# Patient Record
Sex: FEMALE
[De-identification: names, ages, dates, MRNs, and addresses within clinical notes are randomized per-mention and may not be internally consistent; named-entity substitution may affect disease eponyms.]

---

## 2022-10-28 ENCOUNTER — APPOINTMENT (OUTPATIENT)
Dept: OTOLARYNGOLOGY | Facility: CLINIC | Age: 87
End: 2022-10-28

## 2022-10-28 DIAGNOSIS — C44.90 UNSPECIFIED MALIGNANT NEOPLASM OF SKIN, UNSPECIFIED: ICD-10-CM

## 2022-10-28 PROBLEM — Z00.00 ENCOUNTER FOR PREVENTIVE HEALTH EXAMINATION: Status: ACTIVE | Noted: 2022-10-28

## 2022-10-28 PROCEDURE — 11603 EXC TR-EXT MAL+MARG 2.1-3 CM: CPT

## 2022-10-28 PROCEDURE — 99204 OFFICE O/P NEW MOD 45 MIN: CPT | Mod: 25

## 2022-10-28 RX ORDER — CEPHALEXIN 250 MG/1
250 CAPSULE ORAL 3 TIMES DAILY
Qty: 30 | Refills: 0 | Status: ACTIVE | COMMUNITY
Start: 2022-10-28 | End: 1900-01-01

## 2022-10-28 NOTE — HISTORY OF PRESENT ILLNESS
[de-identified] : 89F with reported bx+ left lower leg BCC. Painful. No bleeding. Hx multiple skin cancer resections in the past.

## 2022-10-28 NOTE — PHYSICAL EXAM
[Normal] : palatine tonsils are normal [de-identified] : Well healed previous Mohs reconstructions, no concerning lesions

## 2022-11-09 LAB — CORE LAB BIOPSY: NORMAL

## 2022-11-12 DIAGNOSIS — J02.9 ACUTE PHARYNGITIS, UNSPECIFIED: ICD-10-CM

## 2022-11-12 RX ORDER — AZITHROMYCIN 250 MG/1
250 TABLET, FILM COATED ORAL
Qty: 1 | Refills: 0 | Status: ACTIVE | COMMUNITY
Start: 2022-11-12 | End: 1900-01-01

## 2022-11-15 DIAGNOSIS — G50.0 TRIGEMINAL NEURALGIA: ICD-10-CM

## 2022-12-07 RX ORDER — OXCARBAZEPINE 150 MG/1
150 TABLET, FILM COATED ORAL TWICE DAILY
Qty: 360 | Refills: 2 | Status: ACTIVE | COMMUNITY
Start: 2022-12-07 | End: 1900-01-01

## 2023-01-18 ENCOUNTER — EMERGENCY (EMERGENCY)
Facility: HOSPITAL | Age: 88
LOS: 1 days | Discharge: HOME | End: 2023-01-18
Attending: EMERGENCY MEDICINE
Payer: MEDICARE

## 2023-01-18 VITALS
HEART RATE: 87 BPM | SYSTOLIC BLOOD PRESSURE: 121 MMHG | DIASTOLIC BLOOD PRESSURE: 64 MMHG | WEIGHT: 110.01 LBS | HEIGHT: 60 IN | OXYGEN SATURATION: 100 % | TEMPERATURE: 98 F | RESPIRATION RATE: 16 BRPM

## 2023-01-18 LAB
ALBUMIN SERPL ELPH-MCNC: 4.4 G/DL — SIGNIFICANT CHANGE UP (ref 3.3–5.2)
ALP SERPL-CCNC: 88 U/L — SIGNIFICANT CHANGE UP (ref 40–120)
ALT FLD-CCNC: 28 U/L — SIGNIFICANT CHANGE UP
ANION GAP SERPL CALC-SCNC: 12 MMOL/L — SIGNIFICANT CHANGE UP (ref 5–17)
AST SERPL-CCNC: 33 U/L — HIGH
BASOPHILS # BLD AUTO: 0.05 K/UL — SIGNIFICANT CHANGE UP (ref 0–0.2)
BASOPHILS NFR BLD AUTO: 0.7 % — SIGNIFICANT CHANGE UP (ref 0–2)
BILIRUB SERPL-MCNC: 0.4 MG/DL — SIGNIFICANT CHANGE UP (ref 0.4–2)
BUN SERPL-MCNC: 25.4 MG/DL — HIGH (ref 8–20)
CALCIUM SERPL-MCNC: 9.6 MG/DL — SIGNIFICANT CHANGE UP (ref 8.4–10.5)
CHLORIDE SERPL-SCNC: 102 MMOL/L — SIGNIFICANT CHANGE UP (ref 96–108)
CO2 SERPL-SCNC: 26 MMOL/L — SIGNIFICANT CHANGE UP (ref 22–29)
CREAT SERPL-MCNC: 0.75 MG/DL — SIGNIFICANT CHANGE UP (ref 0.5–1.3)
EGFR: 75 ML/MIN/1.73M2 — SIGNIFICANT CHANGE UP
EOSINOPHIL # BLD AUTO: 0.09 K/UL — SIGNIFICANT CHANGE UP (ref 0–0.5)
EOSINOPHIL NFR BLD AUTO: 1.2 % — SIGNIFICANT CHANGE UP (ref 0–6)
GLUCOSE SERPL-MCNC: 99 MG/DL — SIGNIFICANT CHANGE UP (ref 70–99)
HCT VFR BLD CALC: 38.2 % — SIGNIFICANT CHANGE UP (ref 34.5–45)
HGB BLD-MCNC: 12 G/DL — SIGNIFICANT CHANGE UP (ref 11.5–15.5)
IMM GRANULOCYTES NFR BLD AUTO: 0.3 % — SIGNIFICANT CHANGE UP (ref 0–0.9)
LYMPHOCYTES # BLD AUTO: 1.91 K/UL — SIGNIFICANT CHANGE UP (ref 1–3.3)
LYMPHOCYTES # BLD AUTO: 26.1 % — SIGNIFICANT CHANGE UP (ref 13–44)
MCHC RBC-ENTMCNC: 29.3 PG — SIGNIFICANT CHANGE UP (ref 27–34)
MCHC RBC-ENTMCNC: 31.4 GM/DL — LOW (ref 32–36)
MCV RBC AUTO: 93.4 FL — SIGNIFICANT CHANGE UP (ref 80–100)
MONOCYTES # BLD AUTO: 0.62 K/UL — SIGNIFICANT CHANGE UP (ref 0–0.9)
MONOCYTES NFR BLD AUTO: 8.5 % — SIGNIFICANT CHANGE UP (ref 2–14)
NEUTROPHILS # BLD AUTO: 4.62 K/UL — SIGNIFICANT CHANGE UP (ref 1.8–7.4)
NEUTROPHILS NFR BLD AUTO: 63.2 % — SIGNIFICANT CHANGE UP (ref 43–77)
PLATELET # BLD AUTO: 197 K/UL — SIGNIFICANT CHANGE UP (ref 150–400)
POTASSIUM SERPL-MCNC: 4.2 MMOL/L — SIGNIFICANT CHANGE UP (ref 3.5–5.3)
POTASSIUM SERPL-SCNC: 4.2 MMOL/L — SIGNIFICANT CHANGE UP (ref 3.5–5.3)
PROT SERPL-MCNC: 6.8 G/DL — SIGNIFICANT CHANGE UP (ref 6.6–8.7)
RBC # BLD: 4.09 M/UL — SIGNIFICANT CHANGE UP (ref 3.8–5.2)
RBC # FLD: 13.6 % — SIGNIFICANT CHANGE UP (ref 10.3–14.5)
SODIUM SERPL-SCNC: 140 MMOL/L — SIGNIFICANT CHANGE UP (ref 135–145)
WBC # BLD: 7.31 K/UL — SIGNIFICANT CHANGE UP (ref 3.8–10.5)
WBC # FLD AUTO: 7.31 K/UL — SIGNIFICANT CHANGE UP (ref 3.8–10.5)

## 2023-01-18 PROCEDURE — 99284 EMERGENCY DEPT VISIT MOD MDM: CPT

## 2023-01-18 PROCEDURE — 72170 X-RAY EXAM OF PELVIS: CPT | Mod: 26,59

## 2023-01-18 PROCEDURE — 73502 X-RAY EXAM HIP UNI 2-3 VIEWS: CPT | Mod: 26,RT

## 2023-01-18 PROCEDURE — 72170 X-RAY EXAM OF PELVIS: CPT

## 2023-01-18 PROCEDURE — 85025 COMPLETE CBC W/AUTO DIFF WBC: CPT

## 2023-01-18 PROCEDURE — 73502 X-RAY EXAM HIP UNI 2-3 VIEWS: CPT

## 2023-01-18 PROCEDURE — 72100 X-RAY EXAM L-S SPINE 2/3 VWS: CPT | Mod: 26

## 2023-01-18 PROCEDURE — 93971 EXTREMITY STUDY: CPT

## 2023-01-18 PROCEDURE — 72100 X-RAY EXAM L-S SPINE 2/3 VWS: CPT

## 2023-01-18 PROCEDURE — 36415 COLL VENOUS BLD VENIPUNCTURE: CPT

## 2023-01-18 PROCEDURE — 80053 COMPREHEN METABOLIC PANEL: CPT

## 2023-01-18 PROCEDURE — 93971 EXTREMITY STUDY: CPT | Mod: 26,RT

## 2023-01-18 RX ORDER — IBUPROFEN 200 MG
400 TABLET ORAL ONCE
Refills: 0 | Status: COMPLETED | OUTPATIENT
Start: 2023-01-18 | End: 2023-01-18

## 2023-01-18 RX ADMIN — Medication 400 MILLIGRAM(S): at 11:54

## 2023-01-18 NOTE — ED PROVIDER NOTE - CLINICAL SUMMARY MEDICAL DECISION MAKING FREE TEXT BOX
pt is well appearing with rt sided hip pain radiating to post thigh and now ant thigh, will do xray ls spine, pelvis and hip, control pain, do us to r/o dvt and check labs to r/o hypokalemia as cause of cramps

## 2023-01-18 NOTE — ED ADULT NURSE NOTE - OBJECTIVE STATEMENT
Pt A&O x 4 from home c/o R hip pain. Denies recent injury. Denies heavy lifting or twisting. GCS 15. Strength WNL. Sensory intact. Denies numbness/tingling. Pt medicated as prescribed; will continue to monitor.

## 2023-01-18 NOTE — ED PROVIDER NOTE - NSFOLLOWUPINSTRUCTIONS_ED_ALL_ED_FT
1. follow up with your ortho doctor or clinic provided  2. take motrin or tylenol as needed for pain  3. return promptly in c/o worsening symptoms

## 2023-01-18 NOTE — ED PROVIDER NOTE - PATIENT PORTAL LINK FT
You can access the FollowMyHealth Patient Portal offered by [facility] by registering at the following website: [email or web address]. By joining [facility]’s FollowMyHealth portal, you will also be able to view your health information using other applications (apps) compatible with our system.

## 2023-01-18 NOTE — ED ADULT NURSE NOTE - NSIMPLEMENTINTERV_GEN_ALL_ED
Implemented All Fall with Harm Risk Interventions:  [place] to call system. Call bell, personal items and telephone within reach. Instruct patient to call for assistance. Room bathroom lighting operational. Non-slip footwear when patient is off stretcher. Physically safe environment: no spills, clutter or unnecessary equipment. Stretcher in lowest position, wheels locked, appropriate side rails in place. Provide visual cue, wrist band, yellow gown, etc. Monitor gait and stability. Monitor for mental status changes and reorient to person, place, and time. Review medications for side effects contributing to fall risk. Reinforce activity limits and safety measures with patient and family. Provide visual clues: red socks.

## 2023-01-18 NOTE — ED PROVIDER NOTE - OBJECTIVE STATEMENT
89 y/o F with h/o colon ca 32 yrs ago in remission, s/p melanoma excision, hypertrophic cardiomyopathy on atenolol and independent with ADL p/w sudden onset fo rt hip pain while laying down last night and has difficulty walking and her right foot was dragging. No fall or trauma. Pain is much better now and is mainly in rt thigh on front. No leg numbness or weakness

## 2023-01-18 NOTE — ED PROVIDER NOTE - MUSCULOSKELETAL, MLM
Spine appears normal, range of motion is not limited, no muscle or joint tenderness, pt evan to roll in bed, evan to walk, no foot drop, no focal tenderness of rt hip, no saddle anesthesia

## 2023-10-04 RX ORDER — CARBAMAZEPINE 100 MG/1
100 TABLET, EXTENDED RELEASE ORAL
Qty: 900 | Refills: 2 | Status: ACTIVE | COMMUNITY
Start: 2023-10-04 | End: 1900-01-01

## 2024-02-05 PROBLEM — Z86.79 PERSONAL HISTORY OF OTHER DISEASES OF THE CIRCULATORY SYSTEM: Chronic | Status: ACTIVE | Noted: 2023-01-18

## 2024-02-09 ENCOUNTER — NON-APPOINTMENT (OUTPATIENT)
Age: 89
End: 2024-02-09

## 2024-02-14 ENCOUNTER — APPOINTMENT (OUTPATIENT)
Dept: ORTHOPEDIC SURGERY | Facility: CLINIC | Age: 89
End: 2024-02-14
Payer: MEDICARE

## 2024-02-14 ENCOUNTER — NON-APPOINTMENT (OUTPATIENT)
Age: 89
End: 2024-02-14

## 2024-02-14 VITALS
SYSTOLIC BLOOD PRESSURE: 155 MMHG | HEART RATE: 82 BPM | HEIGHT: 59 IN | BODY MASS INDEX: 22.18 KG/M2 | DIASTOLIC BLOOD PRESSURE: 84 MMHG | WEIGHT: 110 LBS

## 2024-02-14 DIAGNOSIS — M25.561 PAIN IN RIGHT KNEE: ICD-10-CM

## 2024-02-14 PROCEDURE — 99203 OFFICE O/P NEW LOW 30 MIN: CPT | Mod: 25

## 2024-02-14 PROCEDURE — 73564 X-RAY EXAM KNEE 4 OR MORE: CPT | Mod: RT

## 2024-02-14 PROCEDURE — 20611 DRAIN/INJ JOINT/BURSA W/US: CPT | Mod: RT

## 2024-02-14 RX ORDER — ATENOLOL 50 MG/1
50 TABLET ORAL
Refills: 0 | Status: ACTIVE | COMMUNITY

## 2024-02-14 RX ORDER — HYALURONATE SOD, CROSS-LINKED 30 MG/3 ML
30 SYRINGE (ML) INTRAARTICULAR
Refills: 0 | Status: COMPLETED | OUTPATIENT
Start: 2024-02-14

## 2024-02-14 RX ORDER — ATENOLOL 25 MG/1
25 TABLET ORAL
Refills: 0 | Status: ACTIVE | COMMUNITY

## 2024-02-14 RX ADMIN — Medication 0 MG/3ML: at 00:00

## 2024-02-14 NOTE — DISCUSSION/SUMMARY
[de-identified] : This patient presents for initial consultation evaluation regarding right knee pain which has been bothering her for the last month or so.  Patient's physical exam and x-rays was evidence of mild patellofemoral arthritis of the right knee.  She has been taking Motrin which seems to help but she still complaining of symptoms especially when getting up from seated position.  We discussed the diagnosis and treatment recommendations.  I recommended a course of viscosupplementation.  On today's visit we injected gel 1 into the right knee under sterile conditions.  Instructions given postinjection for ice analgesics and modification of activities.  I like to see her back in 3 weeks to see how she has done with the injection.  At least 30 minutes was spent performing the evaluation and management on today's office visit.  This includes but is not limited to preparing to see patient including review of any test results or outside medical records, obtaining and/or reviewing separately obtained history, performing examination and evaluation, counseling and educating the patient on their diagnosis and treatment recommendations, ordering medications, tests, or procedures, documenting clinical information in the electronic health record, independently interpreting results (not separately reported) and communicating results to the patient, and coordination of care.

## 2024-02-14 NOTE — REASON FOR VISIT
[Initial Visit] : an initial visit for [Knee Pain] : knee pain [Knee Osteoarthritis] : knee osteoarthritis [FreeTextEntry2] : Rgiht knee pain

## 2024-02-14 NOTE — PHYSICAL EXAM
[de-identified] : The patient appears well nourished  and in no apparent distress.  The patient is alert and oriented to person, place, and time.   Affect and mood appear normal.    The head is normocephalic and atraumatic.  The eyes reveal normal sclera and extra ocular muscles are intact.   The neck appears normal with no jugular venous distention or masses noted.   Skin shows normal turgor with no evidence of eczema or psoriasis.  No respiratory distress noted.  The patient ambulates with an antalgic gait.  The right knee has range of motion 0 to 120 degrees.  Mild discomfort noted with terminal motion.  Mild tenderness about the patellofemoral joint.  Mild crepitation is noted.  No effusion.   There is a negative Erika sign.  There is no soft tissue swelling, warmth, or erythema.   There is a negative Lachman sign.  There is no instability to varus/valgus stress.  There is no instability to anterior/posterior drawer.  There is normal strength  in the quadriceps and hamstring muscles.  Strength and sensation are intact distally.  There are normal pulses distally and good capillary refill.  No edema or lymphadenopathy noted.   [de-identified] : AP, lateral, tunnel, and merchant views of the right knee were obtained.  There is mild patellofemoral narrowing.  The medial and lateral joint spaces are well maintained without evidence of degenerative arthritis. The alignment of the knee is normal.  No fractures or dislocations are noted.

## 2024-02-14 NOTE — PROCEDURE
[de-identified] : A 20 gauge needle was sterilely placed onto the syringe of Gel One.  the right knee was then sterilely prepped with chlorhexidine and ethylene chloride spray was used as an anesthetic just prior to the injection.  Under ultrasound guidance utilizing the [employer]heng Lumify ultrasound probe the Gel One was injected into the knee joint just above and lateral to the patella into the suprapatellar pouch.  Placement of the injection into the suprapatellar pouch was confirmed by ultrasound and a spot image was saved.  The patient tolerated the procedure well without difficulty.  The patient was given instructions on the use of ice and anti-inflammatories for post injection site soreness.

## 2024-02-14 NOTE — HISTORY OF PRESENT ILLNESS
[de-identified] : This patient presents today for initial consultation evaluation regarding right knee pain.  She denies any injury.  Pain started in January.  Pain level 5 out of 10.  Pain localized to the anterior aspect of knee joint.  Pain worse with getting up from a seated position and going up and down the stairs.  Patient was seen in the emergency room where she was told that she had osteoarthritis.  She presents today for orthopedic evaluation regarding the symptoms.  She has taken Motrin with some relief.

## 2024-03-06 ENCOUNTER — APPOINTMENT (OUTPATIENT)
Dept: ORTHOPEDIC SURGERY | Facility: CLINIC | Age: 89
End: 2024-03-06
Payer: MEDICARE

## 2024-03-06 VITALS — HEART RATE: 71 BPM | SYSTOLIC BLOOD PRESSURE: 166 MMHG | DIASTOLIC BLOOD PRESSURE: 77 MMHG

## 2024-03-06 PROCEDURE — 99213 OFFICE O/P EST LOW 20 MIN: CPT

## 2024-03-06 NOTE — REASON FOR VISIT
[Follow-Up Visit] : a follow-up visit for [FreeTextEntry2] : Right knee osteoarthritis. Had Gel-one injection 2/14/24.

## 2024-03-06 NOTE — PHYSICAL EXAM
[de-identified] : The patient appears well nourished  and in no apparent distress.  The patient is alert and oriented to person, place, and time.   Affect and mood appear normal.    The head is normocephalic and atraumatic.  The eyes reveal normal sclera and extra ocular muscles are intact.   The neck appears normal with no jugular venous distention or masses noted.   Skin shows normal turgor with no evidence of eczema or psoriasis.  No respiratory distress noted.  The patient ambulates with an antalgic gait.  The right knee has range of motion 0 to 120 degrees.  No pain with range of motion.  No tenderness to palpation about the patellofemoral joint or the medial and lateral joint line.  Mild crepitation is noted.  No effusion.   There is a negative [facility] sign.  There is no soft tissue swelling, warmth, or erythema.   There is a negative Lachman sign.  There is no instability to varus/valgus stress.  There is no instability to anterior/posterior drawer.  There is normal strength  in the quadriceps and hamstring muscles.  Strength and sensation are intact distally.  There are normal pulses distally and good capillary refill.  No edema or lymphadenopathy noted.

## 2024-03-06 NOTE — HISTORY OF PRESENT ILLNESS
[de-identified] : This patient presents today for follow-up regarding right knee osteoarthritis.  She did have the gel 1 injection approximately 1 month ago which gave her significant improvement.  She still has some intermittent pain with activity especially getting up from a seated position and when she squats down.  Overall she is feeling quite a bit better and is happy with the result.

## 2024-03-06 NOTE — DISCUSSION/SUMMARY
[de-identified] : This patient presents for follow-up regarding osteoarthritis about the right knee.  She did have a gel 1 injection 1 month ago with significant improvement of her symptoms.  She is doing very well we will continue to observe for now.  If she has recurrence of symptoms I will see her back in the office at that time for reevaluation.  Will consider cortisone injection if the symptoms recur before 6 months.  At least 20 minutes was spent performing the evaluation and management on today's office visit.  This includes but is not limited to preparing to see patient including review of any test results or outside medical records, obtaining and/or reviewing separately obtained history, performing examination and evaluation, counseling and educating the patient on their diagnosis and treatment recommendations, ordering medications, tests, or procedures, documenting clinical information in the electronic health record, independently interpreting results (not separately reported) and communicating results to the patient, and coordination of care.

## 2024-03-20 ENCOUNTER — APPOINTMENT (OUTPATIENT)
Dept: ORTHOPEDIC SURGERY | Facility: CLINIC | Age: 89
End: 2024-03-20
Payer: MEDICARE

## 2024-03-20 VITALS — HEART RATE: 72 BPM | SYSTOLIC BLOOD PRESSURE: 176 MMHG | DIASTOLIC BLOOD PRESSURE: 81 MMHG

## 2024-03-20 DIAGNOSIS — M25.562 PAIN IN LEFT KNEE: ICD-10-CM

## 2024-03-20 PROCEDURE — 20611 DRAIN/INJ JOINT/BURSA W/US: CPT | Mod: LT

## 2024-03-20 PROCEDURE — 99214 OFFICE O/P EST MOD 30 MIN: CPT | Mod: 25

## 2024-03-20 PROCEDURE — 73564 X-RAY EXAM KNEE 4 OR MORE: CPT | Mod: 50

## 2024-03-20 RX ORDER — HYALURONATE SOD, CROSS-LINKED 30 MG/3 ML
30 SYRINGE (ML) INTRAARTICULAR
Qty: 0 | Refills: 0 | Status: COMPLETED | OUTPATIENT
Start: 2024-03-20

## 2024-03-20 RX ADMIN — Medication 0 MG/3ML: at 00:00

## 2024-03-20 NOTE — REASON FOR VISIT
[Follow-Up Visit] : a follow-up visit for [Knee Pain] : knee pain [FreeTextEntry2] : Primary Osteoarthritis of the right knee. Pain in left knee.

## 2024-03-20 NOTE — HISTORY OF PRESENT ILLNESS
[de-identified] : This patient presents for follow-up regarding osteoarthritis about the right knee.  Patient had gel 1 injection approximately particular with excellent result.  Patient no longer has pain in the right knee.  However patient is complaining of pain in the left knee which started about 1 week ago.  Denies any injury.  Pain level 5 out of 10.  She takes Motrin for the pain.  She has some radiation down into the pretibial area.  She notes mild swelling as well.  She notes discomfort at nighttime when she is laying down or also when she is up and ambulating.  Pain is also worse at nighttime.

## 2024-03-20 NOTE — PHYSICAL EXAM
[de-identified] : The patient appears well nourished  and in no apparent distress.  The patient is alert and oriented to person, place, and time.   Affect and mood appear normal.    The head is normocephalic and atraumatic.  The eyes reveal normal sclera and extra ocular muscles are intact.   The neck appears normal with no jugular venous distention or masses noted.   Skin shows normal turgor with no evidence of eczema or psoriasis.  No respiratory distress noted.  The patient ambulates with an antalgic gait.  The right knee has range of motion 0 to 120 degrees.  No pain with range of motion.  No tenderness to palpation about the patellofemoral joint or the medial and lateral joint line.  Mild crepitation is noted.  No effusion.   There is a negative [facility] sign.  There is no soft tissue swelling, warmth, or erythema.   There is a negative Lachman sign.  There is no instability to varus/valgus stress.  There is no instability to anterior/posterior drawer.  There is normal strength  in the quadriceps and hamstring muscles.  Strength and sensation are intact distally.  There are normal pulses distally and good capillary refill.  No edema or lymphadenopathy noted.    The left and right knee has normal range of motion without pain.  There is no effusion.  There is mild medial joint line tenderness of the left knee.  There is a negative Erika sign.  There is no soft tissue swelling, warmth, or erythema.   There is a negative Lachman sign.  There is no instability to varus/valgus stress.  There is no instability to anterior/posterior drawer.  There is normal strength  in the quadriceps and hamstring muscles.  Strength and sensation are intact distally.  There are normal pulses distally and good capillary refill.  No edema or lymphadenopathy noted.   [de-identified] : AP, lateral, tunnel, and merchant views of the left and right knee were obtained.  There is moderate medial joint narrowing.  There is moderate patellofemoral narrowing.  There is patella kimberly..  No fractures or dislocations are noted.

## 2024-03-20 NOTE — DISCUSSION/SUMMARY
[de-identified] : This patient presents today for follow-up regarding osteoarthritis about the right knee.  She did have gel 1 injection on the last office visit with a good result.  She presented today with new onset of left knee pain.  Her physical dam and x-rays was evidence of moderate osteoarthritis about the left knee.  She appears to be having some left knee symptoms from the osteoarthritis and she may also have a component of lumbar radicular pain.  I recommended a course of Gel-One which was injected on today's visit.  Instructions given postinjection for ice analgesics and modification of activities.  I will see him back in the office in 3 to 4 weeks for follow-up and reevaluation.  If she continues to complain of pretibial and lower extremity pain she may require evaluation of her lumbar spine.  At least 30 minutes was spent performing the evaluation and management on today's office visit.  This includes but is not limited to preparing to see patient including review of any test results or outside medical records, obtaining and/or reviewing separately obtained history, performing examination and evaluation, counseling and educating the patient on their diagnosis and treatment recommendations, ordering medications, tests, or procedures, documenting clinical information in the electronic health record, independently interpreting results (not separately reported) and communicating results to the patient, and coordination of care.

## 2024-04-10 ENCOUNTER — APPOINTMENT (OUTPATIENT)
Dept: ORTHOPEDIC SURGERY | Facility: CLINIC | Age: 89
End: 2024-04-10
Payer: MEDICARE

## 2024-04-10 VITALS — DIASTOLIC BLOOD PRESSURE: 73 MMHG | SYSTOLIC BLOOD PRESSURE: 136 MMHG | HEART RATE: 74 BPM

## 2024-04-10 DIAGNOSIS — M17.11 UNILATERAL PRIMARY OSTEOARTHRITIS, RIGHT KNEE: ICD-10-CM

## 2024-04-10 DIAGNOSIS — M17.12 UNILATERAL PRIMARY OSTEOARTHRITIS, LEFT KNEE: ICD-10-CM

## 2024-04-10 PROCEDURE — 99213 OFFICE O/P EST LOW 20 MIN: CPT

## 2024-04-10 NOTE — REASON FOR VISIT
[Follow-Up Visit] : a follow-up visit for [Knee Pain] : knee pain [FreeTextEntry2] : Patient presents for follow visit for osteoarthritis of bilateral knees.

## 2024-04-10 NOTE — HISTORY OF PRESENT ILLNESS
[de-identified] : This patient presents today for follow-up regarding osteoarthritis about the knees.  She has had her gel 1 injection to the left knee approxi-1 month ago.  She notes significant improvement in her pain level.  Currently pain level is 2 out of 10.  She does have some slight pain in the right knee which underwent viscosupplementation on a previous office visit.  Overall she doing much better and ambulating with less difficulty.  She takes Motrin intermittently for the pain.  She does have some discomfort with prolonged standing.

## 2024-04-10 NOTE — DISCUSSION/SUMMARY
[de-identified] : This patient presents today for follow-up regarding osteoarthritis about the knees.  She did have viscosupplementation in both knees over the last few months.  She doing much better with much less pain and ambulating significantly improved.  I recommend she see us back in 3 months for follow-up exam.  She does have any recurrence of pain we will consider a cortisone injection to get her through to the next course of gel shots.  At least 20 minutes was spent performing the evaluation and management on today's office visit.  This includes but is not limited to preparing to see patient including review of any test results or outside medical records, obtaining and/or reviewing separately obtained history, performing examination and evaluation, counseling and educating the patient on their diagnosis and treatment recommendations, ordering medications, tests, or procedures, documenting clinical information in the electronic health record, independently interpreting results (not separately reported) and communicating results to the patient, and coordination of care.

## 2024-04-10 NOTE — PHYSICAL EXAM
[de-identified] : The patient appears well nourished  and in no apparent distress.  The patient is alert and oriented to person, place, and time.   Affect and mood appear normal.    The head is normocephalic and atraumatic.  The eyes reveal normal sclera and extra ocular muscles are intact.   The neck appears normal with no jugular venous distention or masses noted.   Skin shows normal turgor with no evidence of eczema or psoriasis.  No respiratory distress noted.  The patient ambulates with an antalgic gait.  The right left knee has satisfactory range of motion from 0 to 120 degrees.  No pain with range of motion.  No tenderness to palpation about the patellofemoral joint or the medial and lateral joint line.  Mild crepitation is noted.  No effusion.   There is a negative [facility] sign.  There is no soft tissue swelling, warmth, or erythema.   There is a negative Lachman sign.  There is no instability to varus/valgus stress.  There is no instability to anterior/posterior drawer.  There is normal strength  in the quadriceps and hamstring muscles.  Strength and sensation are intact distally.  There are normal pulses distally and good capillary refill.  No edema or lymphadenopathy noted.    The left and right knee has normal range of motion without pain.  There is no effusion.  There is mild medial joint line tenderness of the left knee.  There is a negative Erika sign.  There is no soft tissue swelling, warmth, or erythema.   There is a negative Lachman sign.  There is no instability to varus/valgus stress.  There is no instability to anterior/posterior drawer.  There is normal strength  in the quadriceps and hamstring muscles.  Strength and sensation are intact distally.  There are normal pulses distally and good capillary refill.  No edema or lymphadenopathy noted.

## 2024-04-28 RX ORDER — OXCARBAZEPINE 150 MG/1
150 TABLET, FILM COATED ORAL TWICE DAILY
Qty: 360 | Refills: 2 | Status: ACTIVE | COMMUNITY
Start: 2022-11-15 | End: 1900-01-01

## 2024-05-12 RX ORDER — MELOXICAM 15 MG/1
15 TABLET ORAL DAILY
Qty: 90 | Refills: 3 | Status: ACTIVE | COMMUNITY
Start: 2024-05-12 | End: 1900-01-01

## 2024-05-15 RX ORDER — CARBAMAZEPINE 100 MG/1
100 TABLET, EXTENDED RELEASE ORAL
Qty: 360 | Refills: 3 | Status: ACTIVE | COMMUNITY
Start: 2024-05-15 | End: 1900-01-01

## 2024-07-10 ENCOUNTER — APPOINTMENT (OUTPATIENT)
Dept: ORTHOPEDIC SURGERY | Facility: CLINIC | Age: 89
End: 2024-07-10
Payer: MEDICARE

## 2024-07-10 DIAGNOSIS — M17.11 UNILATERAL PRIMARY OSTEOARTHRITIS, RIGHT KNEE: ICD-10-CM

## 2024-07-10 DIAGNOSIS — M17.12 UNILATERAL PRIMARY OSTEOARTHRITIS, LEFT KNEE: ICD-10-CM

## 2024-07-10 PROCEDURE — 99213 OFFICE O/P EST LOW 20 MIN: CPT

## 2024-07-10 RX ORDER — MELOXICAM 7.5 MG/1
7.5 TABLET ORAL DAILY
Qty: 30 | Refills: 0 | Status: ACTIVE | COMMUNITY
Start: 2024-07-10 | End: 1900-01-01

## 2024-07-26 RX ORDER — OXCARBAZEPINE 150 MG/1
150 TABLET, FILM COATED ORAL TWICE DAILY
Qty: 360 | Refills: 2 | Status: ACTIVE | COMMUNITY
Start: 2024-07-26 | End: 1900-01-01

## 2024-07-30 ENCOUNTER — NON-APPOINTMENT (OUTPATIENT)
Age: 89
End: 2024-07-30

## 2024-07-31 ENCOUNTER — APPOINTMENT (OUTPATIENT)
Dept: ORTHOPEDIC SURGERY | Facility: CLINIC | Age: 89
End: 2024-07-31
Payer: MEDICARE

## 2024-07-31 VITALS — BODY MASS INDEX: 20.96 KG/M2 | HEIGHT: 59 IN | WEIGHT: 104 LBS

## 2024-07-31 DIAGNOSIS — M41.9 SCOLIOSIS, UNSPECIFIED: ICD-10-CM

## 2024-07-31 DIAGNOSIS — M51.36 OTHER INTERVERTEBRAL DISC DEGENERATION, LUMBAR REGION: ICD-10-CM

## 2024-07-31 PROCEDURE — 72100 X-RAY EXAM L-S SPINE 2/3 VWS: CPT

## 2024-07-31 PROCEDURE — 99214 OFFICE O/P EST MOD 30 MIN: CPT

## 2024-07-31 RX ORDER — DICLOFENAC SODIUM 50 MG/1
50 TABLET, DELAYED RELEASE ORAL
Qty: 30 | Refills: 0 | Status: ACTIVE | COMMUNITY
Start: 2024-07-31 | End: 1900-01-01

## 2024-07-31 RX ORDER — MELOXICAM 7.5 MG/1
7.5 TABLET ORAL
Qty: 90 | Refills: 1 | Status: ACTIVE | COMMUNITY
Start: 2024-07-31 | End: 1900-01-01

## 2024-07-31 NOTE — HISTORY OF PRESENT ILLNESS
[Stable] : stable [de-identified] : 89-year-old female presents for initial evaluation of left leg pain x 1 month. No trauma or injury. She has seen Dr. Llanos for her knees, but he believes her L leg symptoms are related to her lumbar spine. She complains of intermittent burning pain over the anterior shin from the knee to the ankle. Symptoms worsens with forward flexion. She has been taking Mobic with mild relief.  She reports occasional numbness over the anterior shin. She has been using a walker for balance. No fever, chills, sweats, nausea/vomiting. No bowel or bladder dysfunction, no recent weight loss or gain. No night pain. This history is in addition to the intake form that I personally reviewed.

## 2024-07-31 NOTE — DISCUSSION/SUMMARY
[de-identified] : Lumbar scoliosis.  Lumbar disc degenerative disease. Severe right hip arthritis. Discussed all options. Meloxicam PRN. Referral for physical therapy. All options discussed including rest, medicine, home exercise, acupuncture, Chiropractic care, Physical Therapy, Pain management, and last resort surgery. All questions were answered, all alternatives discussed, and the patient is in complete agreement with the treatment plan which the patient contributed to and discussed with me through the shared decision-making process. Follow-up appointment as instructed. Any issues and the patient will call or come in sooner.

## 2024-07-31 NOTE — ADDENDUM
[FreeTextEntry1] : This note was written by Paulo Willoughby on 07/31/2024 acting as scribe for Dr. Hermann Cohen M.D.  I, Hermann Cohen MD, have read and attest that all the information, medical decision making and discharge instructions within are true and accurate.

## 2024-07-31 NOTE — PHYSICAL EXAM
[Walker] : ambulates with walker [Ross's Sign] : negative Ross's sign [Pronator Drift] : negative pronator drift [SLR] : negative straight leg raise [de-identified] : 5 out of 5 motor strength, sensation is intact and symmetrical full range of motion flexion extension and rotation, no palpatory tenderness full range of motion of knees shoulders and elbows, no atrophy, negative straight leg raise, no pathological reflexes, no swelling, normal ambulation, no apparent distress skin is intact, no palpable lymph nodes, no upper or lower extremity instability, alert and oriented x3 and normal mood. Normal finger-to nose test.  No upper motor neuron findings. Restricted right hip ROM. [de-identified] : XR AP Lat Lumbar 07/31/2024 -scoliosis, lumbar disc degenerative disease, severe right hip arthritis- reviewed with the patient.   XR AP Bilateral Knees 3/2024 -adequate- reviewed with the patient.

## 2024-12-24 ENCOUNTER — RX RENEWAL (OUTPATIENT)
Age: 88
End: 2024-12-24

## 2025-01-06 NOTE — ED ADULT NURSE NOTE - EXTENSIONS OF SELF_ADULT
None

## 2025-01-17 RX ORDER — OXCARBAZEPINE 150 MG/1
150 TABLET, FILM COATED ORAL TWICE DAILY
Qty: 360 | Refills: 2 | Status: ACTIVE | COMMUNITY
Start: 2025-01-17 | End: 1900-01-01

## 2025-01-19 RX ORDER — DICLOFENAC SODIUM 50 MG/1
50 TABLET, DELAYED RELEASE ORAL
Qty: 60 | Refills: 2 | Status: ACTIVE | COMMUNITY
Start: 2025-01-17 | End: 1900-01-01

## 2025-03-07 ENCOUNTER — NON-APPOINTMENT (OUTPATIENT)
Age: 89
End: 2025-03-07

## 2025-03-12 ENCOUNTER — APPOINTMENT (OUTPATIENT)
Dept: ORTHOPEDIC SURGERY | Facility: CLINIC | Age: 89
End: 2025-03-12
Payer: MEDICARE

## 2025-03-12 VITALS
HEIGHT: 59 IN | WEIGHT: 104 LBS | SYSTOLIC BLOOD PRESSURE: 195 MMHG | DIASTOLIC BLOOD PRESSURE: 88 MMHG | BODY MASS INDEX: 20.96 KG/M2 | HEART RATE: 65 BPM

## 2025-03-12 DIAGNOSIS — M51.369: ICD-10-CM

## 2025-03-12 PROCEDURE — 99214 OFFICE O/P EST MOD 30 MIN: CPT

## 2025-03-12 RX ORDER — CELECOXIB 200 MG/1
200 CAPSULE ORAL DAILY
Qty: 90 | Refills: 0 | Status: ACTIVE | COMMUNITY
Start: 2025-03-12 | End: 1900-01-01

## 2025-05-28 RX ORDER — CELECOXIB 200 MG/1
200 CAPSULE ORAL DAILY
Qty: 90 | Refills: 0 | Status: ACTIVE | COMMUNITY
Start: 2025-05-28 | End: 1900-01-01